# Patient Record
Sex: MALE | Race: OTHER
[De-identification: names, ages, dates, MRNs, and addresses within clinical notes are randomized per-mention and may not be internally consistent; named-entity substitution may affect disease eponyms.]

---

## 2023-04-24 ENCOUNTER — HOSPITAL ENCOUNTER (OUTPATIENT)
Dept: PHYSICAL THERAPY | Facility: CLINIC | Age: 48
Setting detail: THERAPIES SERIES
Discharge: HOME | End: 2023-04-24

## 2023-04-24 PROCEDURE — 9900000067 HC THERAPEUTIC EXERCISE EA 15 MINS (SELF-PAY)

## 2023-04-24 PROCEDURE — 9900000066 HC EVALUATION (SELF-PAY)

## 2023-04-24 NOTE — CONSULTS
Deviation                          ROM   Cervical    Flexion WNL   Extension WNL   Rotation L- WNL R- WNL   Sidebend L- WNL R- WNL   Retraction            TESTS (+/-) LEFT RIGHT Not Tested   Vertebral A   [x]   CRLF    x   Speeds -  []   Neers +  []   Rubi -  []   Empty Can +  []   Drop Arm -  []   Post Apprehension   [x]   Ant Apprehension    [x]   Clunk   [x]   Sulcus   [x]   Elbow varus/valgus   [x]      []      []      []       OBSERVATION No Deficit Deficit Not Tested Comments   Posture       Forward Head [] [x] []    Rounded Shoulders [] [x] []    Kyphosis [] [] []    Lordosis [] [] []    Lateral Shift [] [] []    Scoliosis [] [] []    Iliac Crest [] [] []    PSIS [] [] []    ASIS [] [] []    Genu Valgus [] [] []    Genu Varus [] [] []    Genu Recurvatum [] [] []    Pronation [] [] []    Supination [] [] []    Leg Length Discrp [] [] []    Slumped Sitting [] [x] []    Palpation [] [x] [] Pt reports tenderness along L infraspinatus, L proximal tricep   Sensation [x] [] [] No deficits with testing   Edema [] [] [x]    Neurological [] [] [x]          Flexibility Normal LUE Deficit RUE Deficit   Upper Trap [] [x] [x]   Levator Scapula [] [x] [x]   Scalenes  [] [] []   Pec Major [] [x] [x]   Pec Minor [] [x] [x]   Lats [] [x] [x]   Supinators [] [] []   Pronators [] [] []   Other:                            FUNCTION Normal Difficult Unable   Sitting [x] [] []   Standing [x] [] []   Ambulation [x] [] []   Stairs [x] [] []   Bending [x] [] []   Sit to Stand [x] [] []   Lift/Carry [] [x] []   Overhead reach [] [x] []   Groom/Dress [] [x] []       Functional Test: UEFS Score: 27% functionally impaired         Assessment:    Patient would benefit from skilled physical therapy services in order to: Improve L UE/cervical flexibility, improve L UE ROM, improve L shoulder mechanics, reduce pain, and improve tolerance to functional use to help ease working out recreationally    Problems:    [x] ? Pain  [x] ? ROM  [x] ?

## 2023-04-28 ENCOUNTER — HOSPITAL ENCOUNTER (OUTPATIENT)
Dept: PHYSICAL THERAPY | Facility: CLINIC | Age: 48
Setting detail: THERAPIES SERIES
Discharge: HOME | End: 2023-04-28

## 2023-04-28 PROCEDURE — 9900000067 HC THERAPEUTIC EXERCISE EA 15 MINS (SELF-PAY)

## 2023-04-28 NOTE — FLOWSHEET NOTE
[] [facility]) - [facility] &  Therapy  [address]  [phone]  F: [phone] [] [address]  [facility]   [address]  P: [phone]  F: [phone] [x] [place] &  Therapy  [address]  P: [phone]  F: [phone] [] [address]  P: [phone]  F: [phone] [] [address]  Psychiatric   [address]   [place]: [phone]  F: [phone]      Physical Therapy Daily Treatment Note    Date:  2023  Patient Name:  Adriana Turk    :  1975  MRN: [id number]  Physician: Enrrique Liriano MD                           Insurance: SELF-PAY  Medical Diagnosis: M75.82 (ICD-10-CM) - Other shoulder lesions, left shoulder               Rehab Codes: M25.512, M25.612  Onset Date: 23                                Next 's appt. : --  Visit# / total visits:      Cancels/No Shows: 0/0    Subjective:  Pt reports to PT with no pain, but states that his pain has been coming and going today.   Pain:  [] Yes  [x] No  Location: -  Pain Rating: (0-10 scale) 0/10  Pain altered Tx:  [x] No  [] Yes  Action:  Comments:    Objective:  Modalities:   Precautions: General  Exercise  L Shoulder Reps/ Time Weight/ Level Comments    UBE  6'     x              UT S 3x30\"     x   Levator S 3x30\"     x   Doorway pec S 3x30\"     x   Doorway lat S 3x30\"     x   Posterior capsule S 3x30\"     x   IR towel S 3x30\"     x                         Prone:          Retraction  2x12, 5\"     x   Depression  2x12, 5\"     x   Other:      Treatment Charges: Mins Units   []  Modalities     [x]  Ther Exercise 40 3   []  Manual Therapy     []  Ther Activities     []  Aquatics     []  Vasocompression     []  Other     Total Treatment time 40 3

## 2023-05-01 ENCOUNTER — HOSPITAL ENCOUNTER (OUTPATIENT)
Dept: PHYSICAL THERAPY | Facility: CLINIC | Age: 48
Setting detail: THERAPIES SERIES
Discharge: HOME | End: 2023-05-01

## 2023-05-01 PROCEDURE — 9900000067 HC THERAPEUTIC EXERCISE EA 15 MINS (SELF-PAY)

## 2023-05-01 NOTE — FLOWSHEET NOTE
[] [facility]  [facility]  [address]  [phone]  F: [phone] [] [address]  [place] [address]   [address]  P: [phone]  F: [phone] [x] [place] &  Therapy  [address]  P: [phone]  F: [phone] [] [address]  P: [phone]  F: [phone] [] [address]  [facility]   [address]   [place]: [phone]  F: [phone]      Physical Therapy Daily Treatment Note    Date:  2023  Patient Name:  Dillan Smith    :  1975  MRN: [id number]  Physician: William De Luna MD                           Insurance: SELF-PAY  Medical Diagnosis: M75.82 (ICD-10-CM) - Other shoulder lesions, left shoulder               Rehab Codes: M25.512, M25.612  Onset Date: 23                                Next 's appt. : --  Visit# / total visits: 3/16     Cancels/No Shows: 0/0    Subjective:  Pt mentioned that he has no pain if he doesn't move his arm but movement creates discomfort and popping. Pain:  [] Yes  [x] No  Location: -  Pain Rating: (0-10 scale) 0/10  Pain altered Tx:  [x] No  [] Yes  Action:  Comments:    Objective:  Modalities:   Precautions: General  Exercise  L Shoulder Reps/ Time Weight/ Level Comments    [place]  3'   Flexion/scaption.   x              UT S 3x30\"     x   Levator S 3x30\"        Robbert Heal 3x30\"     x   Doorway lat S 3x30\"     x   Posterior capsule S 3x30\"     x   IR towel S 3x30\"        Ball roll up 10x10\" Yellow ball   x   Ball on wall 20x Bilateral  All directions: yellow SB x                            Prone:          Retraction  2x12, 5\"     x   Depression  2x12, 5\"     x   Extension  20x   x   Row  20x   x   HAB 20x   x                 Other:      Treatment

## 2023-05-04 ENCOUNTER — HOSPITAL ENCOUNTER (OUTPATIENT)
Dept: PHYSICAL THERAPY | Facility: CLINIC | Age: 48
Setting detail: THERAPIES SERIES
Discharge: HOME | End: 2023-05-04

## 2023-05-04 NOTE — FLOWSHEET NOTE
[] [facility] &  Therapy  [address]    [phone]  F: [phone]   [] [address]  [facility]   [address]  P: [phone]  F: [phone]  [] [address]  [address]  P: [phone]  F: [phone] [] [address] [facility] [address] [facility]  P: [phone]  F: [phone]  [] [address]  [address] [facility] [address]   [address]   [place]: [phone]  F: [phone]   [] [place] [address] [facility] [address]  [place]: [phone]   F: [phone]     Physical Therapy Cancel/No Show note    Date: 2023  Patient: Dillan Smith  : 1975  MRN: [id number]    Cancels/No Shows to date:     For today's appointment patient:    [x]  Cancelled    [] Rescheduled appointment    [] No-show     Reason given by patient:    []  Patient ill    []  Conflicting appointment    [] No transportation      [] Conflict with work    [x] No reason given    [] Weather related    [] [id number]    [] Other:      Comments:        [] Next appointment was confirmed    Electronically signed by: Gudelia Carrington